# Patient Record
Sex: FEMALE | Race: WHITE | URBAN - METROPOLITAN AREA
[De-identification: names, ages, dates, MRNs, and addresses within clinical notes are randomized per-mention and may not be internally consistent; named-entity substitution may affect disease eponyms.]

---

## 2021-06-22 ENCOUNTER — TRANSCRIBE ORDER (OUTPATIENT)
Dept: SCHEDULING | Age: 66
End: 2021-06-22

## 2021-06-22 DIAGNOSIS — S63.114A DISLOCATION OF METACARPOPHALANGEAL JOINT OF RIGHT THUMB, INITIAL ENCOUNTER: Primary | ICD-10-CM

## 2023-02-28 ENCOUNTER — OFFICE VISIT (OUTPATIENT)
Dept: OBGYN CLINIC | Age: 68
End: 2023-02-28
Payer: MEDICARE

## 2023-02-28 VITALS
HEIGHT: 66 IN | BODY MASS INDEX: 30.53 KG/M2 | DIASTOLIC BLOOD PRESSURE: 84 MMHG | WEIGHT: 190 LBS | SYSTOLIC BLOOD PRESSURE: 150 MMHG

## 2023-02-28 DIAGNOSIS — Z91.89 ENCOUNTER FOR GYNECOLOGIC EXAMINATION FOR HIGH-RISK PATIENT COVERED BY MEDICARE: Primary | ICD-10-CM

## 2023-02-28 DIAGNOSIS — N81.6 RECTOCELE: ICD-10-CM

## 2023-02-28 DIAGNOSIS — N81.11 CYSTOCELE, MIDLINE: ICD-10-CM

## 2023-02-28 PROCEDURE — G8417 CALC BMI ABV UP PARAM F/U: HCPCS | Performed by: OBSTETRICS & GYNECOLOGY

## 2023-02-28 PROCEDURE — G0101 CA SCREEN;PELVIC/BREAST EXAM: HCPCS | Performed by: OBSTETRICS & GYNECOLOGY

## 2023-02-28 PROCEDURE — 3017F COLORECTAL CA SCREEN DOC REV: CPT | Performed by: OBSTETRICS & GYNECOLOGY

## 2023-02-28 PROCEDURE — 1101F PT FALLS ASSESS-DOCD LE1/YR: CPT | Performed by: OBSTETRICS & GYNECOLOGY

## 2023-02-28 PROCEDURE — 1090F PRES/ABSN URINE INCON ASSESS: CPT | Performed by: OBSTETRICS & GYNECOLOGY

## 2023-02-28 PROCEDURE — G8432 DEP SCR NOT DOC, RNG: HCPCS | Performed by: OBSTETRICS & GYNECOLOGY

## 2023-02-28 RX ORDER — MELOXICAM 15 MG/1
15 TABLET ORAL DAILY
COMMUNITY
Start: 2023-02-07

## 2023-02-28 RX ORDER — LOSARTAN POTASSIUM 100 MG/1
100 TABLET ORAL DAILY
COMMUNITY
Start: 2023-01-24

## 2023-02-28 RX ORDER — HYDROCHLOROTHIAZIDE 25 MG/1
25 TABLET ORAL DAILY
COMMUNITY
Start: 2023-01-29

## 2023-02-28 RX ORDER — FLUCONAZOLE 150 MG/1
TABLET ORAL
COMMUNITY
Start: 2023-02-20

## 2023-02-28 RX ORDER — POTASSIUM CHLORIDE 750 MG/1
10 TABLET, EXTENDED RELEASE ORAL DAILY
COMMUNITY
Start: 2023-01-24

## 2023-02-28 RX ORDER — AMLODIPINE BESYLATE 2.5 MG/1
2.5 TABLET ORAL DAILY
COMMUNITY
Start: 2022-12-08

## 2023-02-28 RX ORDER — MULTIVITAMIN
1 TABLET ORAL DAILY
COMMUNITY

## 2023-02-28 RX ORDER — GABAPENTIN 100 MG/1
CAPSULE ORAL
COMMUNITY
Start: 2023-02-25

## 2023-02-28 RX ORDER — AMOXICILLIN AND CLAVULANATE POTASSIUM 875; 125 MG/1; MG/1
1 TABLET, FILM COATED ORAL 2 TIMES DAILY
COMMUNITY
Start: 2023-02-20

## 2023-02-28 NOTE — PROGRESS NOTES
Omar Green is a 75 y.o. female returns for an annual exam     Chief Complaint   Patient presents with    Well Woman       No LMP recorded. Patient is postmenopausal.  Her periods are  absent patient is post menopausal.      Problems: no significant problems  Birth Control: post menopausal status. Last Pap: normal obtained 1/10/2022. High risk medicare. She does not have a history of LIANE 2, 3 or cervical cancer. Last Mammogram: 4/2022 negative [address]. Last Bone Density: normal obtained 2013. Last colonoscopy: normal obtained 2020.      1. Have you been to the ER, urgent care clinic, or hospitalized since your last visit? No    2. Have you seen or consulted any other health care providers outside of the [address] [place] [address] since your last visit? PCP check up 1 year ago.       Examination chaperoned by Justino Landau, MA.

## 2023-02-28 NOTE — PROGRESS NOTES
ANNUAL EXAM 65+    Maddie Moran is a 75y.o. year old  [address] [facility] female   No LMP recorded. Patient is postmenopausal.    She presents for her annual checkup. Sees dr. Eric Rodríguez regularly in the past for recurrent abnormal pap smears. All low grade/HPV  No issues with menopause. Some chronic pains in right breast following biopsy 8 years ago. No PMB. No discharges no urinary problem. Problems: no significant problems  Birth Control: post menopausal status. Last Pap: normal obtained 1/10/2022. High risk medicare. She does not have a history of LIANE 2, 3 or cervical cancer. Last Mammogram: 2022 negative [address]. Last Bone Density: normal obtained . Last colonoscopy: normal obtained .     Medical History:  Patient Active Problem List   Diagnosis Code    Cystocele, midline N81.11    Rectocele N81.6     Past Medical History:   Diagnosis Date    ASCUS Papanicolaou smear 2006    8, 6    Atypical ductal hyperplasia of breast     Cervical high risk HPV (human papillomavirus) test positive 2007    Dysplasia of cervix, low grade (LIANE 1) 2007    Hypertension     Rosacea      Past Surgical History:   Procedure Laterality Date    CKC, AKA COLD KNIFE CONE      HX BREAST BIOPSY Right     Benign    HX COLONOSCOPY      HX DILATION AND CURETTAGE      HX TONSILLECTOMY       OB History    Para Term  AB Living   3 2 2 0 1 2   SAB IAB Ectopic Molar Multiple Live Births   1 0 0 0 0 2      # Outcome Date GA Lbr Luis/2nd Weight Sex Delivery Anes PTL Lv   3 Term    8 lb 11 oz (3.941 kg) M Vag-Spont   TAY   2 Term    7 lb 14 oz (3.572 kg) F Vag-Spont   TAY   1 SAB              Social History     Socioeconomic History    Marital status:     Number of children: 2   Tobacco Use    Smoking status: Never    Smokeless tobacco: Never   Vaping Use    Vaping Use: Never used   Substance and Sexual Activity    Alcohol use: Yes    Drug use: Never Sexual activity: Yes     Partners: Male     Birth control/protection: None      Family History   Problem Relation Age of Onset    Hypertension Other     Heart Disease Other      Current Outpatient Medications on File Prior to Visit   Medication Sig Dispense Refill    amLODIPine (NORVASC) 2.5 mg tablet Take 2.5 mg by mouth daily. amoxicillin-clavulanate (AUGMENTIN) 875-125 mg per tablet Take 1 Tablet by mouth two (2) times a day.      gabapentin (NEURONTIN) 100 mg capsule       hydroCHLOROthiazide (HYDRODIURIL) 25 mg tablet Take 25 mg by mouth daily. losartan (COZAAR) 100 mg tablet Take 100 mg by mouth daily. meloxicam (MOBIC) 15 mg tablet Take 15 mg by mouth daily. Klor-Con M10 10 mEq tablet Take 10 mEq by mouth daily. docosahexaenoic acid/epa (FISH OIL PO) Take  by mouth.      multivitamin (ONE A DAY) tablet Take 1 Tablet by mouth daily. cholecalciferol, vitamin D3, (VITAMIN D3 PO) Take  by mouth. MAGNESIUM PO Take  by mouth. fluconazole (DIFLUCAN) 150 mg tablet TAKE 1 TABLET BY MOUTH (Patient not taking: Reported on 2/28/2023)       No current facility-administered medications on file prior to visit.      Allergies   Allergen Reactions    Tape [Adhesive] Rash       Review of Systems   History obtained from the patient-negative for:  Constitutional: weight loss, fever, night sweats  HEENT: hearing loss, earache, congestion, snoring, sorethroat  CV: chest pain, palpitations, edema  Resp: cough, shortness of breath, wheezing  Breast: breast lumps, nipple discharge, galactorrhea  GI: change in bowel habits, abdominal pain, black or bloody stools  : frequency, dysuria, hematuria, vaginal discharge  MSK: back pain, joint pain, muscle pain  Skin: itching, rash, hives  Neuro: dizziness, headache, confusion, weakness  Psych: anxiety, depression, change in mood  Heme/lymph: bleeding, bruising, pallor    Physical Exam  Visit Vitals  BP (!) 150/84   Ht 5' 6\" (1.676 m)   Wt 190 lb (86.2 kg)   BMI 30.67 kg/m²       Constitutional  Appearance: well-nourished, well developed, alert, in no acute distress    HENT  Head and Face: appears normal    Neck  Inspection/Palpation: normal appearance, no masses or tenderness  Lymph Nodes: no lymphadenopathy present  Thyroid: gland size normal, nontender, no nodules or masses present on palpation    Chest  Respiratory Effort: breathing unlabored  Auscultation: normal breath sounds    Cardiovascular  Heart:   Auscultation: regular rate and rhythm without murmur    Breasts  Inspection of Breasts: breasts symmetrical, no skin changes, no discharge present, nipple appearance normal, no skin retraction present  Palpation of Breasts and Axillae: no masses present on palpation, no breast tenderness  Axillary Lymph Nodes: no lymphadenopathy present    Gastrointestinal  Abdominal Examination: abdomen non-tender to palpation, normal bowel sounds, no masses present  Liver and spleen: no hepatomegaly present, spleen not palpable  Hernias: no hernias identified    Genitourinary  External Genitalia: normal appearance for age, no discharge present, no tenderness present, no inflammatory lesions present, no masses present, atrophy present  Vagina: atrophic with grade 2 cystocele, grade 1 rectocele, no discharge present, no inflammatory lesions present, no masses present  Bladder: non-tender to palpation  Urethra: appears normal  Cervix: normal appearance   Uterus: normal size shape consistency  Adnexa: no adnexal tenderness present, no adnexal masses present  Perineum: perineum within normal limits, no evidence of trauma, no rashes or skin lesions present  Anus: anus within normal limits, no hemorrhoids present  Inguinal Lymph Nodes: no lymphadenopathy present    Skin  General Inspection: no rash, no lesions identified    Neurologic/Psychiatric  Mental Status:  Orientation: grossly oriented to person, place and time  Mood and Affect: mood normal, affect appropriate    Labs:  No results found for this or any previous visit (from the past 12 hour(s)). Assessment:    ICD-10-CM ICD-9-CM    1. Encounter for gynecologic examination for high-risk patient covered by Medicare  Z91.89 V72.31 PAP IG, RFX APTIMA HPV ASCUS ([id number])     V15.89       2. Cystocele, midline  N81.11 618.01       3.  Rectocele  N81.6 618.04           Plan:  Counseled re: diet, exercise, healthy lifestyle  Rec annual mammogram  Dexa advised (PCP)  Return in about 1 year (around 2/28/2024) for Annual.

## 2023-12-12 ENCOUNTER — OFFICE VISIT (OUTPATIENT)
Age: 68
End: 2023-12-12
Payer: MEDICARE

## 2023-12-12 VITALS — BODY MASS INDEX: 29.05 KG/M2 | SYSTOLIC BLOOD PRESSURE: 144 MMHG | WEIGHT: 180 LBS | DIASTOLIC BLOOD PRESSURE: 89 MMHG

## 2023-12-12 DIAGNOSIS — R10.2 PELVIC PAIN IN FEMALE: Primary | ICD-10-CM

## 2023-12-12 PROCEDURE — 1123F ACP DISCUSS/DSCN MKR DOCD: CPT | Performed by: OBSTETRICS & GYNECOLOGY

## 2023-12-12 PROCEDURE — 99213 OFFICE O/P EST LOW 20 MIN: CPT | Performed by: OBSTETRICS & GYNECOLOGY

## 2023-12-12 RX ORDER — SUCRALFATE 1 G/1
1 TABLET ORAL 4 TIMES DAILY
COMMUNITY

## 2023-12-12 RX ORDER — OMEPRAZOLE 20 MG/1
40 CAPSULE, DELAYED RELEASE ORAL DAILY
COMMUNITY

## 2023-12-12 NOTE — PROGRESS NOTES
PELVIC PAIN EVALUATION      Abraham Urena is 70y.o. year old White (non-) [id number] female who presents for  evaluation of pelvic pain. Patient has been having lower pelvic pain that moves around and is sometimes epigastric -- seems to move. Bowels have been a little odd. Had 1 tarry stool. Had occasional loose stool. Some bloating. Has seen PCP had stool for Guiac pending. Is seeing GI now to get endoscopy due to long term use of meloxicam.  Denies Vaginal bleeding or vaginal discharge. Ultrasound was done today to evaluate these complaints and shows:   TRANSVAGINAL ULTRASOUND PERFORMED UTERUS IS ANTEVERTED, NORMAL IN SIZE AND HETEROGNEOUS IN ECHOGENICITY. ENDOMETRIUM MEASURES 2-3MM IN THICKNESS. A SMALL AMOUNT OF FLUID IS SEEN WITHIN THE ENDOMETRIUM. RIGHT OVARY APPEARS WITHIN NORMAL LIMITS. LEFT OVARY APPEARS WITHIN NORMAL LIMITS. NO FREE FLUID SEEN IN THE CDS.     Problem List:  Patient Active Problem List    Diagnosis Date Noted    Cystocele, midline 2023     Overview Note:     Grade 2        Rectocele 2023     Overview Note:     Grade1         Past Medical History:   Diagnosis Date    Atypical ductal hyperplasia of breast     Atypical squamous cells of undetermined significance (ASCUS) on Papanicolaou smear of cervix 2006    8/, 6/0    Cervical high risk HPV (human papillomavirus) test positive 2007    Dysplasia of cervix, low grade (ROSARIO 1) 2007    Hypertension     Rosacea      Past Surgical History:   Procedure Laterality Date    BREAST BIOPSY Right     Benign    CERVIX BIOPSY      COLONOSCOPY      DILATION AND CURETTAGE OF UTERUS      TONSILLECTOMY         OB History    Para Term  AB Living   3 2 2 0 1 2   SAB IAB Ectopic Molar Multiple Live Births   1 0 0 0 0 2      # Outcome Date GA Lbr Kike/2nd Weight Sex Delivery Anes PTL Lv   3 Term    3.941 kg (8 lb 11 oz) M Vag-Spont   ALICE   2 Term    3.572 kg (7 lb 14 oz) F Vag-Spont

## 2023-12-12 NOTE — PROGRESS NOTES
Nando Guo is a 70 y.o. female presents for a problem visit. Chief Complaint   Patient presents with    Annual Exam     No LMP recorded. Birth Control: post menopausal status. Last Pap: normal obtained 2/28/2023. The patient is reporting having: Ultrasound Follow Up for RLQ pain. PCP referred. Has been pain for about month. Ultrasound report:  TRANSVAGINAL ULTRASOUND PERFORMED UTERUS IS ANTEVERTED, NORMAL IN SIZE AND HETEROGNEOUS IN ECHOGENICITY. ENDOMETRIUM MEASURES 2-3MM IN THICKNESS. A SMALL AMOUNT OF FLUID IS SEEN WITHIN THE ENDOMETRIUM. RIGHT OVARY APPEARS WITHIN NORMAL LIMITS. LEFT OVARY APPEARS WITHIN NORMAL LIMITS. NO FREE FLUID SEEN IN THE CDS        1. Have you been to the ER, urgent care clinic, or hospitalized since your last visit? No    2. Have you seen or consulted any other health care providers outside of the [address] [place] [address] Avenue since your last visit? No    Examination chaperoned by Pedro Amado CMA.

## 2024-03-01 NOTE — PROGRESS NOTES
Chloe Vanessa is a 68 y.o. female returns for an annual exam     Chief Complaint   Patient presents with    Annual Exam       No LMP recorded. Patient is postmenopausal.  Her periods are absent in flow.  Problems: no problems  Birth Control: post menopausal status.  Last Pap: normal obtained 1 year(s) ago. High Risk Medicare.    She does not have a history of ROSARIO 2, 3 or cervical cancer.   Last Mammogram: had a recent mammogram 3/2023 [facility] which was negative for malignancy.     Last Bone Density: normal obtained 2023.  Last colonoscopy: normal obtained 2020.      1. Have you been to the ER, urgent care clinic, or hospitalized since your last visit? No    2. Have you seen or consulted any other health care providers outside of the [facility] since your last visit? No    Examination chaperoned by DEV BLISS CMA.

## 2024-03-04 ENCOUNTER — OFFICE VISIT (OUTPATIENT)
Age: 69
End: 2024-03-04
Payer: MEDICARE

## 2024-03-04 VITALS
SYSTOLIC BLOOD PRESSURE: 131 MMHG | DIASTOLIC BLOOD PRESSURE: 76 MMHG | WEIGHT: 176.2 LBS | HEIGHT: 66 IN | BODY MASS INDEX: 28.32 KG/M2

## 2024-03-04 DIAGNOSIS — Z12.31 BREAST CANCER SCREENING BY MAMMOGRAM: ICD-10-CM

## 2024-03-04 DIAGNOSIS — Z91.89 GYN EXAM FOR HIGH-RISK MEDICARE PATIENT: Primary | ICD-10-CM

## 2024-03-04 PROCEDURE — G0101 CA SCREEN;PELVIC/BREAST EXAM: HCPCS | Performed by: OBSTETRICS & GYNECOLOGY

## 2024-03-04 PROCEDURE — G8484 FLU IMMUNIZE NO ADMIN: HCPCS | Performed by: OBSTETRICS & GYNECOLOGY

## 2024-03-04 NOTE — PROGRESS NOTES
ANNUAL EXAM 65+    History:  Chloe Vanessa is a 68 y.o. year old  White (non-) female   No LMP recorded. Patient is postmenopausal.    She presents for her annual checkup.     No LMP recorded. Patient is postmenopausal.  No hof lashes or night sweats.   Her periods are absent in flow.  No PMB  Problems: no problems  Small pin sized black lesion between fold of right leg  Birth Control: post menopausal status.  Last Pap: normal obtained 1 year(s) ago. High Risk Medicare.    She does not have a history of ROSARIO 2, 3 or cervical cancer.   Last Mammogram: had a recent mammogram 3/2023 [facility] which was negative for malignancy.     Last Bone Density: normal obtained .  Last colonoscopy: normal obtained .    Medical History:  Problem List:  Patient Active Problem List    Diagnosis Date Noted    Cystocele, midline 2023     Overview Note:     Grade 2        Rectocele 2023     Overview Note:     Grade1         Past Medical History:   Diagnosis Date    Atypical ductal hyperplasia of breast     Atypical squamous cells of undetermined significance (ASCUS) on Papanicolaou smear of cervix 2006, 6    Cervical high risk HPV (human papillomavirus) test positive 2007    Dysplasia of cervix, low grade (ROSARIO 1) 2007    Hypertension     Rosacea     Stomach ulcer     From Meloxicam    Visit for review of DEXA scan     WNL.   PCP did at  was normal per pt     Past Surgical History:   Procedure Laterality Date    BREAST BIOPSY Right     Benign    CERVIX BIOPSY      COLONOSCOPY      DILATION AND CURETTAGE OF UTERUS      TONSILLECTOMY         OB History    Para Term  AB Living   3 2 2 0 1 2   SAB IAB Ectopic Molar Multiple Live Births   1 0 0 0 0 2      # Outcome Date GA Lbr Kike/2nd Weight Sex Delivery Anes PTL Lv   3 Term    3.941 kg (8 lb 11 oz) M Vag-Spont   ALICE   2 Term    3.572 kg (7 lb 14 oz) F Vag-Spont   ALICE   1 SAB

## 2024-05-30 ENCOUNTER — TELEPHONE (OUTPATIENT)
Age: 69
End: 2024-05-30

## 2024-05-30 NOTE — TELEPHONE ENCOUNTER
Two patient identifiers used    68 year old patient last seen in the office on 3/4/2024 for ae    Patient calling to say that she had her mammogram done on Tuesday at [facility] imaging and is calling to get the results    Results are in the chart under imaging    Please advise    Thank you

## 2024-05-30 NOTE — TELEPHONE ENCOUNTER
Tolu Gaitan II, MD   to Me       5/30/24  1:59 PM  They were fine.  \      Patient advised of MD reviewed mammogram results and verbalized understanding.    Patient was provided the my chart help desk number to contact for my chart help

## 2025-03-07 ENCOUNTER — OFFICE VISIT (OUTPATIENT)
Age: 70
End: 2025-03-07
Payer: MEDICARE

## 2025-03-07 VITALS
BODY MASS INDEX: 29.09 KG/M2 | WEIGHT: 181 LBS | HEART RATE: 89 BPM | SYSTOLIC BLOOD PRESSURE: 152 MMHG | DIASTOLIC BLOOD PRESSURE: 74 MMHG | HEIGHT: 66 IN | RESPIRATION RATE: 16 BRPM

## 2025-03-07 DIAGNOSIS — Z91.89 GYN EXAM FOR HIGH-RISK MEDICARE PATIENT: ICD-10-CM

## 2025-03-07 DIAGNOSIS — Z12.31 BREAST CANCER SCREENING BY MAMMOGRAM: ICD-10-CM

## 2025-03-07 PROBLEM — M79.7 FIBROMYALGIA: Status: ACTIVE | Noted: 2025-03-07

## 2025-03-07 PROCEDURE — G0101 CA SCREEN;PELVIC/BREAST EXAM: HCPCS | Performed by: OBSTETRICS & GYNECOLOGY

## 2025-03-07 PROCEDURE — 1160F RVW MEDS BY RX/DR IN RCRD: CPT | Performed by: OBSTETRICS & GYNECOLOGY

## 2025-03-07 PROCEDURE — 1159F MED LIST DOCD IN RCRD: CPT | Performed by: OBSTETRICS & GYNECOLOGY

## 2025-03-07 RX ORDER — GABAPENTIN 300 MG/1
300 CAPSULE ORAL 2 TIMES DAILY
COMMUNITY
Start: 2025-01-23

## 2025-03-07 RX ORDER — MELOXICAM 15 MG/1
15 TABLET ORAL DAILY
COMMUNITY
Start: 2025-02-19

## 2025-03-07 RX ORDER — OMEPRAZOLE 20 MG/1
20 CAPSULE, DELAYED RELEASE ORAL DAILY
COMMUNITY

## 2025-03-07 NOTE — PROGRESS NOTES
Chloe Vanessa is a 69 y.o. female returns for an annual exam     Chief Complaint   Patient presents with    Annual Exam       No LMP recorded. Patient is postmenopausal.  Her periods are absent in flow.  Problems: no problems  Birth Control: post menopausal status.  Last Pap: normal obtained 1 year(s) ago High Risk Medicare.    She does not have a history of ROSARIO 2, 3 or cervical cancer.   Last Mammogram: had a recent mammogram 5/28/2024 [facility] which was negative for malignancy.     Last Bone Density: normal obtained 2023.  Last colonoscopy: normal obtained 2020.      1. Have you been to the ER, urgent care clinic, or hospitalized since your last visit? No    2. Have you seen or consulted any other health care providers outside of the [facility] since your last visit? Yes    Examination chaperoned by DEV BLISS CMA.  
normal    Neck  Inspection/Palpation: normal appearance, no masses or tenderness  Lymph Nodes: no lymphadenopathy present  Thyroid: gland size normal, nontender, no nodules or masses present on palpation    Chest  Respiratory Effort: breathing unlabored  Auscultation: normal breath sounds    Cardiovascular  Heart:  Auscultation: regular rate and rhythm without murmur    Breasts  Inspection of Breasts: breasts symmetrical, no skin changes, no discharge present, nipple appearance normal, no skin retraction present  Palpation of Breasts and Axillae: no masses present on palpation, no breast tenderness  Axillary Lymph Nodes: no lymphadenopathy present    Gastrointestinal  Abdominal Examination: abdomen non-tender to palpation, normal bowel sounds, no masses present  Liver and spleen: no hepatomegaly present, spleen not palpable  Hernias: no hernias identified    Genitourinary  External Genitalia: normal appearance for age, no discharge present, no tenderness present, no inflammatory lesions present, no masses present, atrophy present  Vagina: atrophic with mild cystocele and rectocele, no discharge present, no inflammatory lesions present, no masses present  Bladder: non-tender to palpation  Urethra: appears normal  Cervix: normal   Uterus: normal size, shape and consistency  Adnexa: no adnexal tenderness present, no adnexal masses present  Perineum: perineum within normal limits, no evidence of trauma, no rashes or skin lesions present  Anus: anus within normal limits, no hemorrhoids present  Inguinal Lymph Nodes: no lymphadenopathy present    Skin  General Inspection: no rash, no lesions identified    Neurologic/Psychiatric  Mental Status:  Orientation: grossly oriented to person, place and time  Mood and Affect: mood normal, affect appropriate    Labs:  No results found for this or any previous visit (from the past 12 hour(s)).    Results:  Results         Assessment:   Diagnosis Orders   1. GYN exam for high-risk

## 2025-03-20 ENCOUNTER — RESULTS FOLLOW-UP (OUTPATIENT)
Age: 70
End: 2025-03-20

## 2025-03-20 LAB
., LABCORP: NORMAL
CYTOLOGIST CVX/VAG CYTO: NORMAL
CYTOLOGY CVX/VAG DOC CYTO: NORMAL
CYTOLOGY CVX/VAG DOC THIN PREP: NORMAL
DX ICD CODE: NORMAL
Lab: NORMAL
OTHER STN SPEC: NORMAL
SERVICE CMNT-IMP: NORMAL
STAT OF ADQ CVX/VAG CYTO-IMP: NORMAL

## 2025-05-30 ENCOUNTER — RESULTS FOLLOW-UP (OUTPATIENT)
Age: 70
End: 2025-05-30

## 2025-07-24 ENCOUNTER — TRANSCRIBE ORDERS (OUTPATIENT)
Facility: HOSPITAL | Age: 70
End: 2025-07-24

## 2025-07-24 DIAGNOSIS — M17.11 PRIMARY LOCALIZED OSTEOARTHRITIS OF RIGHT KNEE: Primary | ICD-10-CM

## 2025-08-03 ENCOUNTER — HOSPITAL ENCOUNTER (OUTPATIENT)
Facility: HOSPITAL | Age: 70
Discharge: HOME | End: 2025-08-06
Attending: ORTHOPAEDIC SURGERY

## 2025-08-03 DIAGNOSIS — M17.11 PRIMARY LOCALIZED OSTEOARTHRITIS OF RIGHT KNEE: ICD-10-CM

## 2025-08-03 PROCEDURE — 73700 CT LOWER EXTREMITY W/O DYE: CPT
